# Patient Record
Sex: FEMALE | Race: BLACK OR AFRICAN AMERICAN | NOT HISPANIC OR LATINO | URBAN - METROPOLITAN AREA
[De-identification: names, ages, dates, MRNs, and addresses within clinical notes are randomized per-mention and may not be internally consistent; named-entity substitution may affect disease eponyms.]

---

## 2017-01-10 ENCOUNTER — ANESTHESIA (OUTPATIENT)
Dept: GASTROENTEROLOGY | Facility: HOSPITAL | Age: 27
End: 2017-01-10

## 2017-01-10 ENCOUNTER — ANESTHESIA EVENT (OUTPATIENT)
Dept: GASTROENTEROLOGY | Facility: HOSPITAL | Age: 27
End: 2017-01-10

## 2017-01-10 PROCEDURE — 25010000002 PROPOFOL 10 MG/ML EMULSION: Performed by: ANESTHESIOLOGY

## 2017-01-10 RX ORDER — PROPOFOL 10 MG/ML
VIAL (ML) INTRAVENOUS AS NEEDED
Status: DISCONTINUED | OUTPATIENT
Start: 2017-01-10 | End: 2017-01-10 | Stop reason: SURG

## 2017-01-10 RX ORDER — PROPOFOL 10 MG/ML
VIAL (ML) INTRAVENOUS CONTINUOUS PRN
Status: DISCONTINUED | OUTPATIENT
Start: 2017-01-10 | End: 2017-01-10 | Stop reason: SURG

## 2017-01-10 RX ADMIN — SODIUM CHLORIDE, POTASSIUM CHLORIDE, SODIUM LACTATE AND CALCIUM CHLORIDE: 600; 310; 30; 20 INJECTION, SOLUTION INTRAVENOUS at 08:48

## 2017-01-10 RX ADMIN — PROPOFOL 100 MG: 10 INJECTION, EMULSION INTRAVENOUS at 08:49

## 2017-01-10 RX ADMIN — PROPOFOL 100 MCG/KG/MIN: 10 INJECTION, EMULSION INTRAVENOUS at 08:49

## 2017-01-10 NOTE — ANESTHESIA POSTPROCEDURE EVALUATION
Patient: Keri Pool    Procedure Summary     Date Anesthesia Start Anesthesia Stop Room / Location    01/10/17 0849 0904  [facility]       Procedure Diagnosis Surgeon Provider    ESOPHAGOGASTRODUODENOSCOPY WITH BX (N/A Esophagus) Early satiety; Epigastric pain  (Early satiety [R68.81]; Epigastric pain [R10.13]) MD Christopher Tao MD          Anesthesia Type: MAC  Last vitals  /85 (01/10/17 0932)    Temp      Pulse 69 (01/10/17 0932)   Resp 16 (01/10/17 0932)    SpO2 100 % (01/10/17 0932)      Post Anesthesia Care and Evaluation    Patient location during evaluation: PACU  Patient participation: complete - patient participated  Level of consciousness: awake and alert  Pain score: 0  Pain management: adequate  Airway patency: patent  Anesthetic complications: No anesthetic complications  Cardiovascular status: acceptable  Respiratory status: acceptable  Hydration status: acceptable

## 2017-01-10 NOTE — ANESTHESIA PREPROCEDURE EVALUATION
Anesthesia Evaluation     Patient summary reviewed and Nursing notes reviewed    Airway   Mallampati: II  TM distance: >3 FB  Neck ROM: full  no difficulty expected  Dental - normal exam     Pulmonary - negative pulmonary ROS and normal exam   Cardiovascular - negative cardio ROS and normal exam  Exercise tolerance: good (4-7 METS)    Rhythm: regular  Rate: normal    Neuro/Psych- negative ROS  GI/Hepatic/Renal/Endo      Musculoskeletal (-) negative ROS    Abdominal  - normal exam   Substance History - negative use     OB/GYN          Other - negative ROS                            Anesthesia Plan    ASA 1     MAC     intravenous induction   Anesthetic plan and risks discussed with patient.

## 2017-01-10 NOTE — ANESTHESIA POSTPROCEDURE EVALUATION
Patient: Keri Pool    Procedure Summary     Date Anesthesia Start Anesthesia Stop Room / Location    01/10/17 0849 0904  [facility]       Procedure Diagnosis Surgeon Provider    ESOPHAGOGASTRODUODENOSCOPY WITH BX (N/A Esophagus) Early satiety; Epigastric pain  (Early satiety [R68.81]; Epigastric pain [R10.13]) MD Christopher Tao MD          Anesthesia Type: MAC  Last vitals  /85 (01/10/17 0932)    Temp      Pulse 69 (01/10/17 0932)   Resp 16 (01/10/17 0932)    SpO2 100 % (01/10/17 0932)      Post Anesthesia Care and Evaluation    Patient location during evaluation: PACU  Patient participation: complete - patient participated  Level of consciousness: awake and alert  Pain management: adequate  Airway patency: patent  Anesthetic complications: No anesthetic complications  Cardiovascular status: acceptable  Respiratory status: acceptable  Hydration status: acceptable

## 2017-01-26 ENCOUNTER — TELEPHONE (OUTPATIENT)
Dept: GASTROENTEROLOGY | Facility: CLINIC | Age: 27
End: 2017-01-26

## 2017-01-26 NOTE — TELEPHONE ENCOUNTER
----- Message from Dre Cunningham MD sent at 1/12/2017  3:11 PM EST -----  Essentially normal biopsies from EGD.  Follow-up as needed

## 2017-01-26 NOTE — TELEPHONE ENCOUNTER
Patient called advised as per Dr. Cunningham's note her EGD biopsies were normal and should f/u as needed. She verb understanding.

## 2023-07-19 ENCOUNTER — HOSPITAL ENCOUNTER (EMERGENCY)
Facility: HOSPITAL | Age: 33
Discharge: HOME | End: 2023-07-20
Attending: EMERGENCY MEDICINE | Admitting: EMERGENCY MEDICINE
Payer: COMMERCIAL

## 2023-07-19 DIAGNOSIS — O26.899 ABDOMINAL PAIN AFFECTING PREGNANCY: ICD-10-CM

## 2023-07-19 DIAGNOSIS — R10.9 ABDOMINAL PAIN AFFECTING PREGNANCY: ICD-10-CM

## 2023-07-19 DIAGNOSIS — N89.8 VAGINAL DISCHARGE: ICD-10-CM

## 2023-07-19 DIAGNOSIS — O21.9 VOMITING DURING PREGNANCY: Primary | ICD-10-CM

## 2023-07-19 DIAGNOSIS — N39.0 ACUTE UTI: ICD-10-CM

## 2023-07-19 LAB
BASOPHILS # BLD AUTO: 0.02 10*3/MM3 (ref 0–0.2)
BASOPHILS NFR BLD AUTO: 0.4 % (ref 0–1.5)
BILIRUB UR QL STRIP: NEGATIVE
CLARITY UR: CLEAR
COLOR UR: YELLOW
DEPRECATED RDW RBC AUTO: 47.4 FL (ref 37–54)
EOSINOPHIL # BLD AUTO: 0.15 10*3/MM3 (ref 0–0.4)
EOSINOPHIL NFR BLD AUTO: 2.9 % (ref 0.3–6.2)
ERYTHROCYTE [DISTWIDTH] IN BLOOD BY AUTOMATED COUNT: 13.3 % (ref 12.3–15.4)
GLUCOSE UR STRIP-MCNC: NEGATIVE MG/DL
HCT VFR BLD AUTO: 39.4 % (ref 34–46.6)
HGB BLD-MCNC: 12.8 G/DL (ref 12–15.9)
HGB UR QL STRIP.AUTO: NEGATIVE
IMM GRANULOCYTES # BLD AUTO: 0 10*3/MM3 (ref 0–0.05)
IMM GRANULOCYTES NFR BLD AUTO: 0 % (ref 0–0.5)
KETONES UR QL STRIP: ABNORMAL
LEUKOCYTE ESTERASE UR QL STRIP.AUTO: ABNORMAL
LYMPHOCYTES # BLD AUTO: 2.04 10*3/MM3 (ref 0.7–3.1)
LYMPHOCYTES NFR BLD AUTO: 39.2 % (ref 19.6–45.3)
MCH RBC QN AUTO: 31 PG (ref 26.6–33)
MCHC RBC AUTO-ENTMCNC: 32.5 G/DL (ref 31.5–35.7)
MCV RBC AUTO: 95.4 FL (ref 79–97)
MONOCYTES # BLD AUTO: 0.44 10*3/MM3 (ref 0.1–0.9)
MONOCYTES NFR BLD AUTO: 8.4 % (ref 5–12)
NEUTROPHILS NFR BLD AUTO: 2.56 10*3/MM3 (ref 1.7–7)
NEUTROPHILS NFR BLD AUTO: 49.1 % (ref 42.7–76)
NITRITE UR QL STRIP: NEGATIVE
PH UR STRIP.AUTO: 6 [PH] (ref 5–8)
PLATELET # BLD AUTO: 187 10*3/MM3 (ref 140–450)
PMV BLD AUTO: 10 FL (ref 6–12)
PROT UR QL STRIP: NEGATIVE
RBC # BLD AUTO: 4.13 10*6/MM3 (ref 3.77–5.28)
SP GR UR STRIP: 1.02 (ref 1–1.03)
UROBILINOGEN UR QL STRIP: ABNORMAL
WBC NRBC COR # BLD: 5.21 10*3/MM3 (ref 3.4–10.8)

## 2023-07-19 PROCEDURE — 81001 URINALYSIS AUTO W/SCOPE: CPT | Performed by: EMERGENCY MEDICINE

## 2023-07-19 PROCEDURE — 83690 ASSAY OF LIPASE: CPT | Performed by: EMERGENCY MEDICINE

## 2023-07-19 PROCEDURE — 96374 THER/PROPH/DIAG INJ IV PUSH: CPT

## 2023-07-19 PROCEDURE — 87491 CHLMYD TRACH DNA AMP PROBE: CPT | Performed by: EMERGENCY MEDICINE

## 2023-07-19 PROCEDURE — 96361 HYDRATE IV INFUSION ADD-ON: CPT

## 2023-07-19 PROCEDURE — 87086 URINE CULTURE/COLONY COUNT: CPT | Performed by: EMERGENCY MEDICINE

## 2023-07-19 PROCEDURE — 25010000002 METOCLOPRAMIDE PER 10 MG: Performed by: EMERGENCY MEDICINE

## 2023-07-19 PROCEDURE — 87591 N.GONORRHOEAE DNA AMP PROB: CPT | Performed by: EMERGENCY MEDICINE

## 2023-07-19 PROCEDURE — 99284 EMERGENCY DEPT VISIT MOD MDM: CPT

## 2023-07-19 PROCEDURE — 85025 COMPLETE CBC W/AUTO DIFF WBC: CPT | Performed by: EMERGENCY MEDICINE

## 2023-07-19 PROCEDURE — 80053 COMPREHEN METABOLIC PANEL: CPT | Performed by: EMERGENCY MEDICINE

## 2023-07-19 RX ORDER — METOCLOPRAMIDE HYDROCHLORIDE 5 MG/ML
10 INJECTION INTRAMUSCULAR; INTRAVENOUS ONCE
Status: COMPLETED | OUTPATIENT
Start: 2023-07-19 | End: 2023-07-19

## 2023-07-19 RX ORDER — SODIUM CHLORIDE 0.9 % (FLUSH) 0.9 %
10 SYRINGE (ML) INJECTION AS NEEDED
Status: DISCONTINUED | OUTPATIENT
Start: 2023-07-19 | End: 2023-07-20 | Stop reason: HOSPADM

## 2023-07-19 RX ADMIN — SODIUM CHLORIDE 1000 ML: 9 INJECTION, SOLUTION INTRAVENOUS at 23:48

## 2023-07-19 RX ADMIN — METOCLOPRAMIDE HYDROCHLORIDE 10 MG: 5 INJECTION INTRAMUSCULAR; INTRAVENOUS at 23:56

## 2023-07-19 NOTE — Clinical Note
[facility]  [address]  [place] [address]    Keri Pool was seen and treated in our emergency department on 7/19/2023.  She may return to work on 07/20/2023.         Thank you for choosing [facility].    Alpa Perez RN

## 2023-07-20 VITALS
SYSTOLIC BLOOD PRESSURE: 122 MMHG | HEART RATE: 72 BPM | DIASTOLIC BLOOD PRESSURE: 87 MMHG | HEIGHT: 68 IN | TEMPERATURE: 98 F | BODY MASS INDEX: 25.01 KG/M2 | WEIGHT: 165 LBS | OXYGEN SATURATION: 98 % | RESPIRATION RATE: 16 BRPM

## 2023-07-20 LAB
ALBUMIN SERPL-MCNC: 4.5 G/DL (ref 3.5–5.2)
ALBUMIN/GLOB SERPL: 1.7 G/DL
ALP SERPL-CCNC: 65 U/L (ref 39–117)
ALT SERPL W P-5'-P-CCNC: 7 U/L (ref 1–33)
ANION GAP SERPL CALCULATED.3IONS-SCNC: 9.6 MMOL/L (ref 5–15)
AST SERPL-CCNC: 10 U/L (ref 1–32)
BACTERIA UR QL AUTO: ABNORMAL /HPF
BILIRUB SERPL-MCNC: 0.4 MG/DL (ref 0–1.2)
BUN SERPL-MCNC: 8 MG/DL (ref 6–20)
BUN/CREAT SERPL: 12.3 (ref 7–25)
CALCIUM SPEC-SCNC: 9.6 MG/DL (ref 8.6–10.5)
CHLORIDE SERPL-SCNC: 104 MMOL/L (ref 98–107)
CO2 SERPL-SCNC: 21.4 MMOL/L (ref 22–29)
CREAT SERPL-MCNC: 0.65 MG/DL (ref 0.57–1)
EGFRCR SERPLBLD CKD-EPI 2021: 120.9 ML/MIN/1.73
GLOBULIN UR ELPH-MCNC: 2.7 GM/DL
GLUCOSE SERPL-MCNC: 99 MG/DL (ref 65–99)
HYALINE CASTS UR QL AUTO: ABNORMAL /LPF
LIPASE SERPL-CCNC: 22 U/L (ref 13–60)
POTASSIUM SERPL-SCNC: 3.6 MMOL/L (ref 3.5–5.2)
PROT SERPL-MCNC: 7.2 G/DL (ref 6–8.5)
RBC # UR STRIP: ABNORMAL /HPF
REF LAB TEST METHOD: ABNORMAL
SODIUM SERPL-SCNC: 135 MMOL/L (ref 136–145)
SQUAMOUS #/AREA URNS HPF: ABNORMAL /HPF
WBC # UR STRIP: ABNORMAL /HPF

## 2023-07-20 PROCEDURE — 87798 DETECT AGENT NOS DNA AMP: CPT | Performed by: EMERGENCY MEDICINE

## 2023-07-20 PROCEDURE — 87801 DETECT AGNT MULT DNA AMPLI: CPT | Performed by: EMERGENCY MEDICINE

## 2023-07-20 PROCEDURE — 87661 TRICHOMONAS VAGINALIS AMPLIF: CPT | Performed by: EMERGENCY MEDICINE

## 2023-07-20 RX ORDER — CEPHALEXIN 500 MG/1
500 CAPSULE ORAL 2 TIMES DAILY
Qty: 14 CAPSULE | Refills: 0 | Status: SHIPPED | OUTPATIENT
Start: 2023-07-20 | End: 2023-07-27

## 2023-07-20 RX ORDER — PROMETHAZINE HYDROCHLORIDE 12.5 MG/1
12.5 TABLET ORAL EVERY 6 HOURS PRN
Qty: 20 TABLET | Refills: 0 | Status: SHIPPED | OUTPATIENT
Start: 2023-07-20

## 2023-07-20 RX ORDER — CEPHALEXIN 500 MG/1
500 CAPSULE ORAL ONCE
Status: COMPLETED | OUTPATIENT
Start: 2023-07-20 | End: 2023-07-20

## 2023-07-20 RX ADMIN — CEPHALEXIN 500 MG: 500 CAPSULE ORAL at 01:21

## 2023-07-20 NOTE — FSED PROVIDER NOTE
Subjective   History of Present Illness  32-year-old female  presents complaining of nausea and vomiting.  Patient states she is about 6 weeks pregnant and had an ultrasound performed yesterday showing an IUP, ANDREA 3/14/24.  She has been having some mild pelvic cramping recently that she assumed was related to the pregnancy.  She has had nausea and nonbilious nonbloody vomiting x3 today so she was sent home from work for further evaluation.  Patient denies fevers.  She denies dysuria or hematuria.  She denies vaginal bleeding but reports she has had some yeasty discharge.    History provided by:  Patient   used: Yes      Review of Systems   Constitutional: Negative.  Negative for fever.   Respiratory: Negative.  Negative for shortness of breath.    Cardiovascular: Negative.  Negative for chest pain.   Gastrointestinal:  Positive for nausea and vomiting. Negative for abdominal pain.   Genitourinary:  Positive for pelvic pain (mild cramping) and vaginal discharge. Negative for dysuria and vaginal bleeding.   All other systems reviewed and are negative.    Past Medical History:   Diagnosis Date    ASCUS with positive high risk HPV cervical     Chlamydia     Gallbladder disease     Herpes simplex type 2 infection     Vaginal delivery        Allergies   Allergen Reactions    Sulfa Antibiotics Rash       Past Surgical History:   Procedure Laterality Date    CHOLECYSTECTOMY      ENDOSCOPY N/A 1/10/2017    Procedure: ESOPHAGOGASTRODUODENOSCOPY WITH BX;  Surgeon: Dre Cunningham MD;  Location: [facility] END[facility]COPY;  Service:        Family History   Problem Relation Age of Onset    Heart disease Other         FH of heart disease in males before age 55       Social History     Socioeconomic History    Marital status: Single   Tobacco Use    Smoking status: Never    Smokeless tobacco: Never   Vaping Use    Vaping Use: Never used   Substance and Sexual Activity    Alcohol use: No    Drug use: Yes      Frequency: 3.0 times per week     Types: Marijuana    Sexual activity: Defer           Objective   Physical Exam  Vitals and nursing note reviewed.   Constitutional:       General: She is not in acute distress.     Appearance: She is not diaphoretic.   HENT:      Head: Normocephalic and atraumatic.      Right Ear: External ear normal.      Left Ear: External ear normal.      Nose: Nose normal.   Eyes:      Pupils: Pupils are equal, round, and reactive to light.   Cardiovascular:      Rate and Rhythm: Normal rate and regular rhythm.      Heart sounds: Normal heart sounds.   Pulmonary:      Effort: Pulmonary effort is normal. No respiratory distress.      Breath sounds: Normal breath sounds.   Abdominal:      Palpations: Abdomen is soft.      Tenderness: There is no abdominal tenderness.   Genitourinary:     General: Normal vulva.      Vagina: Vaginal discharge present.      Cervix: Normal.      Uterus: Normal.       Adnexa: Right adnexa normal and left adnexa normal.      Comments: RN chaperone  White clumpy discharge  Musculoskeletal:         General: No swelling or tenderness. Normal range of motion.      Cervical back: Normal range of motion and neck supple.   Skin:     General: Skin is warm and dry.      Findings: No rash.   Neurological:      General: No focal deficit present.      Mental Status: She is alert and oriented to person, place, and time.   Psychiatric:         Mood and Affect: Mood normal.         Behavior: Behavior normal.       Procedures           ED Course                                           Medical Decision Making  Number and Complexity of Problems  Differential Diagnosis: Electrolyte abnormality, UTI, hyperemesis, threatened AB    MDM Data  My Laboratory interpretation: Unremarkable except (+) LE in the urine, pending vaginal swabs  My Radiology interpretation: n/a not indicated    Treatment and Disposition  ED Course: 32-year-old female presenting with symptoms as described.  She already  has a confirmed IUP and her bedside ultrasound today was unremarkable.  She has had improvement with IV fluids and symptom control.  She is tolerating oral intake.  Her labs are unremarkable.  She is having no bleeding so Rh testing and/or formal ultrasound are not indicated.  We will treat empirically for UTI and yeast infection pending cultures and swab information.  Patient declined any other needs including for STD prophylaxis.    Shared decision making: Discharge home, symptom control, treatment for yeast    Problems Addressed:  Abdominal pain affecting pregnancy: complicated acute illness or injury  Acute UTI: complicated acute illness or injury  Vaginal discharge: complicated acute illness or injury  Vomiting during pregnancy: complicated acute illness or injury    Amount and/or Complexity of Data Reviewed  Labs: ordered.    Risk  OTC drugs.  Prescription drug management.        Final diagnoses:   Vomiting during pregnancy   Abdominal pain affecting pregnancy   Acute UTI   Vaginal discharge       ED Disposition  ED Disposition       ED Disposition   Discharge    Condition   Stable    Comment   --               Your OB/GYN    Schedule an appointment as soon as possible for a visit            Medication List        New Prescriptions      cephalexin 500 MG capsule  Commonly known as: KEFLEX  Take 1 capsule by mouth 2 (Two) Times a Day for 7 days.     miconazole 2 % vaginal cream  Commonly known as: MICOTIN  Insert 1 applicator into the vagina Every Night for 7 days.     promethazine 12.5 MG tablet  Commonly known as: PHENERGAN  Take 1 tablet by mouth Every 6 (Six) Hours As Needed for Nausea or Vomiting.               Where to Get Your Medications        These medications were sent to [id number] - [place], [place] - [address] AT [facility] - [phone]  - [phone] [address] [address]      Phone: [phone]   cephalexin 500 MG  capsule  miconazole 2 % vaginal cream  promethazine 12.5 MG tablet

## 2023-07-20 NOTE — DISCHARGE INSTRUCTIONS
Your vaginal swabs are pending.  We are treating for presumed yeast infection.  You will receive a call if you need any other medications.

## 2023-07-21 LAB
BACTERIA SPEC AEROBE CULT: NO GROWTH
C TRACH RRNA SPEC QL NAA+PROBE: NEGATIVE
N GONORRHOEA RRNA SPEC QL NAA+PROBE: NEGATIVE
SPECIMEN STATUS: NORMAL

## 2023-07-22 LAB
A VAGINAE DNA VAG QL NAA+PROBE: ABNORMAL SCORE
BVAB2 DNA VAG QL NAA+PROBE: ABNORMAL SCORE
C ALBICANS DNA VAG QL NAA+PROBE: NEGATIVE
C GLABRATA DNA VAG QL NAA+PROBE: NEGATIVE
MEGA1 DNA VAG QL NAA+PROBE: ABNORMAL SCORE

## 2023-07-22 RX ORDER — CLINDAMYCIN HYDROCHLORIDE 300 MG/1
300 CAPSULE ORAL 2 TIMES DAILY
Qty: 14 CAPSULE | Refills: 0 | Status: SHIPPED | OUTPATIENT
Start: 2023-07-22 | End: 2023-07-26

## 2023-07-22 NOTE — PROGRESS NOTES
Pt is 1st trimester.  I spoke with Ms Pool regarding BV diagnosis.   I sent in clindamycin 300 bid x 7 days and recommended retesting after treatment.

## 2023-07-25 LAB — T VAGINALIS RRNA SPEC QL NAA+PROBE: POSITIVE

## 2023-07-26 ENCOUNTER — TELEPHONE (OUTPATIENT)
Dept: EMERGENCY DEPT | Facility: HOSPITAL | Age: 33
End: 2023-07-26
Payer: COMMERCIAL

## 2023-07-26 RX ORDER — METRONIDAZOLE 500 MG/1
500 TABLET ORAL 2 TIMES DAILY
Qty: 14 TABLET | Refills: 0 | Status: SHIPPED | OUTPATIENT
Start: 2023-07-26 | End: 2023-08-02

## 2023-08-09 ENCOUNTER — OFFICE VISIT (OUTPATIENT)
Dept: OBSTETRICS AND GYNECOLOGY | Age: 33
End: 2023-08-09
Payer: COMMERCIAL

## 2023-08-09 ENCOUNTER — TRANSCRIBE ORDERS (OUTPATIENT)
Dept: OBSTETRICS AND GYNECOLOGY | Facility: CLINIC | Age: 33
End: 2023-08-09
Payer: COMMERCIAL

## 2023-08-09 VITALS
BODY MASS INDEX: 22.43 KG/M2 | SYSTOLIC BLOOD PRESSURE: 122 MMHG | DIASTOLIC BLOOD PRESSURE: 82 MMHG | HEIGHT: 68 IN | WEIGHT: 148 LBS

## 2023-08-09 DIAGNOSIS — A59.01 TRICHOMONAL VAGINITIS: ICD-10-CM

## 2023-08-09 DIAGNOSIS — Z12.4 ENCOUNTER FOR PAPANICOLAOU SMEAR FOR CERVICAL CANCER SCREENING: ICD-10-CM

## 2023-08-09 DIAGNOSIS — O28.3 ABNORMAL FETAL ULTRASOUND: ICD-10-CM

## 2023-08-09 DIAGNOSIS — O21.9 NAUSEA AND VOMITING IN PREGNANCY: ICD-10-CM

## 2023-08-09 DIAGNOSIS — Z34.90 EARLY STAGE OF PREGNANCY: Primary | ICD-10-CM

## 2023-08-09 DIAGNOSIS — Z11.51 SCREENING FOR HPV (HUMAN PAPILLOMAVIRUS): ICD-10-CM

## 2023-08-09 DIAGNOSIS — Z87.42 H/O ABNORMAL CERVICAL PAPANICOLAOU SMEAR: ICD-10-CM

## 2023-08-09 DIAGNOSIS — N76.0 BV (BACTERIAL VAGINOSIS): ICD-10-CM

## 2023-08-09 DIAGNOSIS — B96.89 BV (BACTERIAL VAGINOSIS): ICD-10-CM

## 2023-08-09 RX ORDER — DOXYLAMINE SUCCINATE AND PYRIDOXINE HYDROCHLORIDE 20; 20 MG/1; MG/1
1 TABLET, EXTENDED RELEASE ORAL 2 TIMES DAILY
Qty: 60 TABLET | Refills: 0 | Status: SHIPPED | OUTPATIENT
Start: 2023-08-09 | End: 2023-08-10

## 2023-08-09 RX ORDER — CLINDAMYCIN HYDROCHLORIDE 300 MG/1
300 CAPSULE ORAL 3 TIMES DAILY
COMMUNITY
End: 2023-08-09

## 2023-08-09 RX ORDER — CEPHALEXIN 500 MG/1
500 CAPSULE ORAL 2 TIMES DAILY
COMMUNITY
End: 2023-08-09

## 2023-08-09 RX ORDER — METRONIDAZOLE 500 MG/1
500 TABLET ORAL 2 TIMES DAILY
COMMUNITY
End: 2023-08-09

## 2023-08-09 RX ORDER — CLOTRIMAZOLE 1 %
CREAM WITH APPLICATOR VAGINAL
COMMUNITY
Start: 2023-07-20 | End: 2023-08-09

## 2023-08-09 RX ORDER — ONDANSETRON 4 MG/1
4 TABLET, FILM COATED ORAL DAILY PRN
Qty: 30 TABLET | Refills: 1 | Status: SHIPPED | OUTPATIENT
Start: 2023-08-09 | End: 2024-08-08

## 2023-08-09 NOTE — Clinical Note
Hi. She has a pretty complicated situation. Please review her chart and let me know if there is anything else you would suggest

## 2023-08-09 NOTE — PROGRESS NOTES
"Subjective     Chief Complaint   Patient presents with    Possible Pregnancy     New/re-establish pt of Dr French pregnancy confirmation with ultrasound. C/o very sick has not been able to take any medication.       Keri Pool is a 32 y.o.  whose LMP is Patient's last menstrual period was 2023. presents to establish care for new pregnancy  She is re establishing care  Last seen by me in 2016    Has h/o 2 's, one in  and one in     This is unplanned and the FOB is not involved   Of note, FOB just fathered a child that had an 8% chance of survival-baby was delivered early and  a month later  She is unsure what the dx was  Was considering an ab pill or  but unable to travel    She has nausea and vomiting  Having a hard time eating  Was seen in the ER recently and given meds for nausea, also dxed with a uti, bv and trich  She has been unable to take the medications d/t her illness  Has h/o this with previous pregnancies and eventually needed a PICC line with one of her pregnancies    She does have a h/o abnormal pap  Declines exam today d/t illness    Has been at the [facility] dealing with her anxiety  Was prescribed meclizine but I have advised her to d/c this  We can try zoloft    She works at [employer]  Is on medical leave b/c of her mental health but really needs to keep this job    No Additional Complaints Reported    The following portions of the patient's history were reviewed and updated as appropriate:no additional history reviewed, vital signs, allergies, current medications, past medical history, past social history, past surgical history, and problem list      Review of Systems   Genitourinary:positive for early stage of pregnancy     Objective      /82   Ht 172.7 cm (68\")   Wt 67.1 kg (148 lb)   LMP 2023   BMI 22.50 kg/mý     Physical Exam    General:   alert, comfortable, and no distress   Heart: Not performed today   Lungs: Not performed today.   Breast: Not " performed today   Neck: Not performed today   Abdomen: Not performed today   CVA: Not performed today   Pelvis: Not performed today   Extremities: Not performed today   Neurologic: negative   Psychiatric: Normal affect, judgement, and mood       Lab Review   Labs: reviewed labs from hospital  Trichomonas vaginalis, PCR - Swab, Vagina (07/20/2023 00:58)   Imaging   Ultrasound - Pelvic Vaginal  Fetal heart rate: 171  Basis for EDC: LMP consistent with US (first trimester)  ANDREA 3/7/2024  Possible edema fetal head and abdomen  Assessment & Plan     ASSESSMENT  1. Early stage of pregnancy    2. Encounter for Papanicolaou smear for cervical cancer screening    3. Screening for HPV (human papillomavirus)    4. H/O abnormal cervical Papanicolaou smear    5. Trichomonal vaginitis    6. Nausea and vomiting in pregnancy    7. BV (bacterial vaginosis)          PLAN  1.   Orders Placed This Encounter   Procedures    Comprehensive Metabolic Panel    Hemoglobin A1c    TSH    [place] Panorama Prenatal Test Full Panel: Panorama Test Plus 5 Additional Microdeletions - Blood,    [place] Horizon 14 (Pan-Ethnic Standard) - Blood,    Ambulatory Referral to [facility]/Perinatology    CBC & Differential       2. Medications prescribed this encounter:        New Medications Ordered This Visit   Medications    ondansetron (Zofran) 4 MG tablet     Sig: Take 1 tablet by mouth Daily As Needed for Nausea or Vomiting.     Dispense:  30 tablet     Refill:  1    Doxylamine-Pyridoxine ER (Bonjesta) 20-20 MG tablet controlled-release     Sig: Take 1 tablet by mouth 2 (Two) Times a Day.     Dispense:  60 tablet     Refill:  0       3. Start meds for nausea. Declines rectal suppositories. Will try zofran odt but also prescribed bonjesta if she is able to swallow a pill.     4. Disc u/s findings. Would recommend NIPT. She is >9 wks so testing done today.  Also refer to [facility].      5. She may look into alternate options in [place].     Currently unable to tolerate  po meds. Will hopefully get improvement with her nausea and vomiting and can then treat her bv and trich.      Labs ordered today to assess electrolytes.  May need HH to treat her sx's.     Follow up: 2 week(s)    RUDY Lemons  8/9/2023

## 2023-08-10 ENCOUNTER — OFFICE VISIT (OUTPATIENT)
Dept: OBSTETRICS AND GYNECOLOGY | Facility: CLINIC | Age: 33
End: 2023-08-10
Payer: COMMERCIAL

## 2023-08-10 ENCOUNTER — TELEPHONE (OUTPATIENT)
Dept: OBSTETRICS AND GYNECOLOGY | Age: 33
End: 2023-08-10
Payer: COMMERCIAL

## 2023-08-10 ENCOUNTER — HOSPITAL ENCOUNTER (OUTPATIENT)
Dept: ULTRASOUND IMAGING | Facility: HOSPITAL | Age: 33
Discharge: HOME | End: 2023-08-10
Admitting: OBSTETRICS & GYNECOLOGY
Payer: COMMERCIAL

## 2023-08-10 VITALS
HEIGHT: 68 IN | DIASTOLIC BLOOD PRESSURE: 80 MMHG | SYSTOLIC BLOOD PRESSURE: 132 MMHG | TEMPERATURE: 99.8 F | BODY MASS INDEX: 22.58 KG/M2 | WEIGHT: 149 LBS | HEART RATE: 103 BPM

## 2023-08-10 DIAGNOSIS — Z03.73 SUSPECTED FETAL ANOMALY NOT FOUND: Primary | ICD-10-CM

## 2023-08-10 DIAGNOSIS — Z34.90 EARLY STAGE OF PREGNANCY: ICD-10-CM

## 2023-08-10 DIAGNOSIS — O28.3 ABNORMAL FETAL ULTRASOUND: ICD-10-CM

## 2023-08-10 LAB
ALBUMIN SERPL-MCNC: 4.8 G/DL (ref 3.5–5.2)
ALBUMIN/GLOB SERPL: 1.9 G/DL
ALP SERPL-CCNC: 62 U/L (ref 39–117)
ALT SERPL-CCNC: 24 U/L (ref 1–33)
AST SERPL-CCNC: 20 U/L (ref 1–32)
BASOPHILS # BLD AUTO: 0.03 10*3/MM3 (ref 0–0.2)
BASOPHILS NFR BLD AUTO: 0.6 % (ref 0–1.5)
BILIRUB SERPL-MCNC: 0.3 MG/DL (ref 0–1.2)
BUN SERPL-MCNC: 6 MG/DL (ref 6–20)
BUN/CREAT SERPL: 8.5 (ref 7–25)
CALCIUM SERPL-MCNC: 10.1 MG/DL (ref 8.6–10.5)
CHLORIDE SERPL-SCNC: 100 MMOL/L (ref 98–107)
CO2 SERPL-SCNC: 24.1 MMOL/L (ref 22–29)
CREAT SERPL-MCNC: 0.71 MG/DL (ref 0.57–1)
EGFRCR SERPLBLD CKD-EPI 2021: 116.7 ML/MIN/1.73
EOSINOPHIL # BLD AUTO: 0.1 10*3/MM3 (ref 0–0.4)
EOSINOPHIL NFR BLD AUTO: 1.9 % (ref 0.3–6.2)
ERYTHROCYTE [DISTWIDTH] IN BLOOD BY AUTOMATED COUNT: 12.1 % (ref 12.3–15.4)
GLOBULIN SER CALC-MCNC: 2.5 GM/DL
GLUCOSE SERPL-MCNC: 82 MG/DL (ref 65–99)
HBA1C MFR BLD: 5.4 % (ref 4.8–5.6)
HCT VFR BLD AUTO: 41.2 % (ref 34–46.6)
HGB BLD-MCNC: 14.3 G/DL (ref 12–15.9)
IMM GRANULOCYTES # BLD AUTO: 0.01 10*3/MM3 (ref 0–0.05)
IMM GRANULOCYTES NFR BLD AUTO: 0.2 % (ref 0–0.5)
LYMPHOCYTES # BLD AUTO: 1.3 10*3/MM3 (ref 0.7–3.1)
LYMPHOCYTES NFR BLD AUTO: 24.4 % (ref 19.6–45.3)
MCH RBC QN AUTO: 32 PG (ref 26.6–33)
MCHC RBC AUTO-ENTMCNC: 34.7 G/DL (ref 31.5–35.7)
MCV RBC AUTO: 92.2 FL (ref 79–97)
MONOCYTES # BLD AUTO: 0.42 10*3/MM3 (ref 0.1–0.9)
MONOCYTES NFR BLD AUTO: 7.9 % (ref 5–12)
NEUTROPHILS # BLD AUTO: 3.46 10*3/MM3 (ref 1.7–7)
NEUTROPHILS NFR BLD AUTO: 65 % (ref 42.7–76)
NRBC BLD AUTO-RTO: 0 /100 WBC (ref 0–0.2)
PLATELET # BLD AUTO: 207 10*3/MM3 (ref 140–450)
POTASSIUM SERPL-SCNC: 4.1 MMOL/L (ref 3.5–5.2)
PROT SERPL-MCNC: 7.3 G/DL (ref 6–8.5)
RBC # BLD AUTO: 4.47 10*6/MM3 (ref 3.77–5.28)
SODIUM SERPL-SCNC: 135 MMOL/L (ref 136–145)
TSH SERPL DL<=0.005 MIU/L-ACNC: 1.28 UIU/ML (ref 0.27–4.2)
WBC # BLD AUTO: 5.32 10*3/MM3 (ref 3.4–10.8)

## 2023-08-10 PROCEDURE — 76815 OB US LIMITED FETUS(S): CPT

## 2023-08-10 PROCEDURE — 76817 TRANSVAGINAL US OBSTETRIC: CPT

## 2023-08-10 RX ORDER — DOXYLAMINE SUCCINATE AND PYRIDOXINE HYDROCHLORIDE, DELAYED RELEASE TABLETS 10 MG/10 MG 10; 10 MG/1; MG/1
TABLET, DELAYED RELEASE ORAL
Qty: 100 TABLET | Refills: 2 | Status: SHIPPED | OUTPATIENT
Start: 2023-08-10 | End: 2023-08-14 | Stop reason: SDUPTHER

## 2023-08-10 NOTE — PROGRESS NOTES
MATERNAL FETAL MEDICINE Consult Note    Dear Dr Hanane iFtzgerald PA:    Thank you for your kind referral of Keri Pool.  As you know, she is a 32 y.o.   at  10 0/7 weeks gestation (Estimated Date of Delivery: 3/7/24). This is a consult.      Her antepartum course is complicated by:  Cystic hygroma not found    Aneuploidy Screening: pending    HPI: Today, she denies headache, blurry vision, RUQ pain. No vaginal bleeding, no contractions.     Review of History:  Past Medical History:   Diagnosis Date    ASCUS with positive high risk HPV cervical     Chlamydia     Gallbladder disease     Herpes simplex type 2 infection     Vaginal delivery      Past Surgical History:   Procedure Laterality Date    CHOLECYSTECTOMY      ENDOSCOPY N/A 1/10/2017    Procedure: ESOPHAGOGASTRODUODENOSCOPY WITH BX;  Surgeon: Dre Cunningham MD;  Location: [facility] ENDOSCOPY;  Service:          Social History     Socioeconomic History    Marital status: Single   Tobacco Use    Smoking status: Never     Passive exposure: Never    Smokeless tobacco: Never   Vaping Use    Vaping Use: Never used   Substance and Sexual Activity    Alcohol use: No    Drug use: Yes     Frequency: 3.0 times per week     Types: Marijuana    Sexual activity: Yes     Family History   Problem Relation Age of Onset    Heart disease Other         FH of heart disease in males before age 55      Allergies   Allergen Reactions    Sulfa Antibiotics Rash      Current Outpatient Medications on File Prior to Visit   Medication Sig Dispense Refill    ondansetron (Zofran) 4 MG tablet Take 1 tablet by mouth Daily As Needed for Nausea or Vomiting. 30 tablet 1    promethazine (PHENERGAN) 12.5 MG tablet Take 1 tablet by mouth Every 6 (Six) Hours As Needed for Nausea or Vomiting. 20 tablet 0    valACYclovir (VALTREX) 500 MG tablet Take 1 tablet by mouth 2 (Two) Times a Day. 60 tablet 6    [DISCONTINUED] Doxylamine-Pyridoxine ER (Bonjesta) 20-20 MG tablet  "controlled-release Take 1 tablet by mouth 2 (Two) Times a Day. (Patient not taking: Reported on 8/10/2023) 60 tablet 0     No current facility-administered medications on file prior to visit.        Past obstetric, gynecological, medical, surgical, family and social history reviewed.  Relevant lab work and imaging reviewed.    Review of systems  Constitutional:  denies fever, chills, malaise.   ENT/Mouth:  denies sore throat, tinnitis  Eyes: denies vision changes/pain  CV:  denies chest pain  Respiratory:  denies cough/SOB  GI:  denies N/V, diarrhea, abdominal pain.    :   denies dysuria  Skin:  denies lesions or pruritis   Neuro:  denies weakness, focal neurologic symptoms    Vitals:    08/10/23 1112   BP: 132/80   BP Location: Right arm   Patient Position: Sitting   Pulse: 103   Temp: 99.8 øF (37.7 øC)   TempSrc: Temporal   Weight: 67.6 kg (149 lb)   Height: 172.7 cm (68\")       PHYSICAL EXAM   GENERAL: Not in acute distress, AAOx3, pleasant  CARDIO: regular rate and rhythm  PULM: symmetric chest rise, speaking in complete sentences without difficulty  NEURO: awake, alert and oriented to person, place, and time  ABDOMINAL: No fundal tenderness, no rebound or guarding, gravid  EXTREMITIES: no bilateral lower extremity edema/tenderness  SKIN: Warm, well-perfused      ULTRASOUND   Please view full ultrasound note on Imaging tab in [employer].  Live viable early intrauterine pregnancy.  Size consistent with dates.  Normal appearing nuchal translucency without evidence of cystic hygroma.   FHT: 176 bpm.     ASSESSMENT/COUNSELIN y.o.   at  10 0/7 weeks gestation (Estimated Date of Delivery: 3/7/24).     -Pregnancy  [ X ] stable  [   ] improving [  ] worsening    Diagnoses and all orders for this visit:    1. Suspected fetal anomaly not found (Primary)         Cystic hygroma, not found:  Discussed that see the fluid filled area on the back of the baby's neck, but it looks very normal.  No evidence of " cystic hygroma or other anomalies, but significantly limited by gestational age.  I am optimistic, but offered patient anatomy here at 18 weeks and she will follow up her cell free DNA screening.  We will do detailed anatomy, but was able to reassure her that I think everything we are seeing right now looks normal.      Summary of Plan  -Anatomy at [facility] 18 weeks    Follow-up: 18 week anatomy at [facility]    Thank you for the consult and opportunity to care for this patient.  Please feel free to reach out with any questions or concerns.      I spent 30 minutes caring for this patient on this date of service. This time includes time spent by me in the following activities: preparing for the visit, reviewing tests, obtaining and/or reviewing a separately obtained history, performing a medically appropriate examination and/or evaluation, counseling and educating the patient/family/caregiver and independently interpreting results and communicating that information with the patient/family/caregiver with greater than 50% spent in counseling and coordination of care.       I spent 5 minutes on the separately reported service of US imaging not included in the time used to support the E/M service also reported today.      Cyn Delatorre MD FACOG  Maternal Fetal Medicine-[facility]  Office: [phone]  [email or web address]

## 2023-08-10 NOTE — TELEPHONE ENCOUNTER
DELETE AFTER REVIEWING: Telephone encounter to be sent to the clinical pool   Hub staff attempted to follow warm transfer process and was unsuccessful     Caller: Keri Pool S    Relationship to patient: Self    Best call back number: [phone]    Patient is needing: PATIENT CALLED AND STATED THAT HER INSURANCE WOULD NOT COVER THE MEDICATION THAT WAS CALLED IN YESTERDAY. SHE WOULD LIKE TO KNOW IF SHE CAN SWITCH TO SOMETHING HER INSURANCE WILL COVER. SHE ALSO WOULD LIKE TO KNOW WHAT 2 MEDICATIONS SHE IS ALLOWED TO TAKE FOR BV.        OK TO CALL ANYTIME  OK TO [facility]

## 2023-08-10 NOTE — LETTER
August 10, 2023     Guillaume French MD  [address] [facility]  [address]  [place] [id number]    Patient: Keri Pool   YOB: 1990   Date of Visit: 8/10/2023       Dear Guillaume French MD,    Thank you for referring Keri Pool to me for evaluation. Below is a copy of my consult note.    If you have questions, please do not hesitate to call me. I look forward to following Keri along with you.         Sincerely,        Cyn Delatorre MD      MATERNAL FETAL MEDICINE Consult Note    Dear Dr Hanane Fitzgerald, PA:    Thank you for your kind referral of Keri Pool.  As you know, she is a 32 y.o.   at  10 0/7 weeks gestation (Estimated Date of Delivery: 3/7/24). This is a consult.      Her antepartum course is complicated by:  Cystic hygroma not found    Aneuploidy Screening: pending    HPI: Today, she denies headache, blurry vision, RUQ pain. No vaginal bleeding, no contractions.     Review of History:  Past Medical History:   Diagnosis Date    ASCUS with positive high risk HPV cervical     Chlamydia     Gallbladder disease     Herpes simplex type 2 infection     Vaginal delivery      Past Surgical History:   Procedure Laterality Date    CHOLECYSTECTOMY      ENDOSCOPY N/A 1/10/2017    Procedure: ESOPHAGOGASTRODUODENOSCOPY WITH BX;  Surgeon: Dre Cunningham MD;  Location: [facility] ENDOSCOPY;  Service:          Social History     Socioeconomic History    Marital status: Single   Tobacco Use    Smoking status: Never     Passive exposure: Never    Smokeless tobacco: Never   Vaping Use    Vaping Use: Never used   Substance and Sexual Activity    Alcohol use: No    Drug use: Yes     Frequency: 3.0 times per week     Types: Marijuana    Sexual activity: Yes     Family History   Problem Relation Age of Onset    Heart disease Other         FH of heart disease in males before age 55      Allergies   Allergen Reactions    Sulfa Antibiotics Rash      Current Outpatient  "Medications on File Prior to Visit   Medication Sig Dispense Refill    ondansetron (Zofran) 4 MG tablet Take 1 tablet by mouth Daily As Needed for Nausea or Vomiting. 30 tablet 1    promethazine (PHENERGAN) 12.5 MG tablet Take 1 tablet by mouth Every 6 (Six) Hours As Needed for Nausea or Vomiting. 20 tablet 0    valACYclovir (VALTREX) 500 MG tablet Take 1 tablet by mouth 2 (Two) Times a Day. 60 tablet 6    [DISCONTINUED] Doxylamine-Pyridoxine ER (Bonjesta) 20-20 MG tablet controlled-release Take 1 tablet by mouth 2 (Two) Times a Day. (Patient not taking: Reported on 8/10/2023) 60 tablet 0     No current facility-administered medications on file prior to visit.        Past obstetric, gynecological, medical, surgical, family and social history reviewed.  Relevant lab work and imaging reviewed.    Review of systems  Constitutional:  denies fever, chills, malaise.   ENT/Mouth:  denies sore throat, tinnitis  Eyes: denies vision changes/pain  CV:  denies chest pain  Respiratory:  denies cough/SOB  GI:  denies N/V, diarrhea, abdominal pain.    :   denies dysuria  Skin:  denies lesions or pruritis   Neuro:  denies weakness, focal neurologic symptoms    Vitals:    08/10/23 1112   BP: 132/80   BP Location: Right arm   Patient Position: Sitting   Pulse: 103   Temp: 99.8 øF (37.7 øC)   TempSrc: Temporal   Weight: 67.6 kg (149 lb)   Height: 172.7 cm (68\")       PHYSICAL EXAM   GENERAL: Not in acute distress, AAOx3, pleasant  CARDIO: regular rate and rhythm  PULM: symmetric chest rise, speaking in complete sentences without difficulty  NEURO: awake, alert and oriented to person, place, and time  ABDOMINAL: No fundal tenderness, no rebound or guarding, gravid  EXTREMITIES: no bilateral lower extremity edema/tenderness  SKIN: Warm, well-perfused      ULTRASOUND   Please view full ultrasound note on Imaging tab in ViewPoint.  Live viable early intrauterine pregnancy.  Size consistent with dates.  Normal appearing nuchal " translucency without evidence of cystic hygroma.   FHT: 176 bpm.     ASSESSMENT/COUNSELIN y.o.   at  10 0/7 weeks gestation (Estimated Date of Delivery: 3/7/24).     -Pregnancy  [ X ] stable  [   ] improving [  ] worsening    Diagnoses and all orders for this visit:    1. Suspected fetal anomaly not found (Primary)         Cystic hygroma, not found:  Discussed that see the fluid filled area on the back of the baby's neck, but it looks very normal.  No evidence of cystic hygroma or other anomalies, but significantly limited by gestational age.  I am optimistic, but offered patient anatomy here at 18 weeks and she will follow up her cell free DNA screening.  We will do detailed anatomy, but was able to reassure her that I think everything we are seeing right now looks normal.      Summary of Plan  -Anatomy at [facility] 18 weeks    Follow-up: 18 week anatomy at [facility]    Thank you for the consult and opportunity to care for this patient.  Please feel free to reach out with any questions or concerns.      I spent 30 minutes caring for this patient on this date of service. This time includes time spent by me in the following activities: preparing for the visit, reviewing tests, obtaining and/or reviewing a separately obtained history, performing a medically appropriate examination and/or evaluation, counseling and educating the patient/family/caregiver and independently interpreting results and communicating that information with the patient/family/caregiver with greater than 50% spent in counseling and coordination of care.       I spent 5 minutes on the separately reported service of US imaging not included in the time used to support the E/M service also reported today.      Cyn Delatorre MD FACOG  Maternal Fetal Medicine-[facility]  Office: [phone]  [email or web address]

## 2023-08-10 NOTE — PROGRESS NOTES
Pt. Reports that she is doing well and denies vaginal bleeding, contractions or leaking of fluid at this time. Has cramping. Reports no active fetal movement.  Denies headache, visual changes or epigastric pain.  Denies any additional complaints at time of appointment.  Next OB appointment scheduled for 8/29/23.    Vitals:    08/10/23 1112   BP: 132/80   Pulse: 103   Temp: 99.8 øF (37.7 øC)

## 2023-08-10 NOTE — TELEPHONE ENCOUNTER
Spoke with pt and she was able to get the zofran but not the bonjesta.  We also talked about that the medication that she needed to take was her flagyl.  Pt understood.  Tried to do a PA on bonjesta and the medication is not approved.  Hanane will send in diclegis and see if they will cover.

## 2023-08-14 RX ORDER — DOXYLAMINE SUCCINATE AND PYRIDOXINE HYDROCHLORIDE, DELAYED RELEASE TABLETS 10 MG/10 MG 10; 10 MG/1; MG/1
TABLET, DELAYED RELEASE ORAL
Qty: 100 TABLET | Refills: 2 | Status: SHIPPED | OUTPATIENT
Start: 2023-08-14

## 2023-08-14 NOTE — TELEPHONE ENCOUNTER
Zofran is giving her a headache, and they pharmacy does not have the diclegis in stock at this time.    She wants to know what to do.

## 2023-08-15 ENCOUNTER — HOSPITAL ENCOUNTER (EMERGENCY)
Facility: HOSPITAL | Age: 33
Discharge: HOME | End: 2023-08-15
Attending: EMERGENCY MEDICINE
Payer: COMMERCIAL

## 2023-08-15 VITALS
RESPIRATION RATE: 18 BRPM | DIASTOLIC BLOOD PRESSURE: 68 MMHG | HEIGHT: 68 IN | HEART RATE: 84 BPM | TEMPERATURE: 98 F | BODY MASS INDEX: 21.22 KG/M2 | SYSTOLIC BLOOD PRESSURE: 113 MMHG | OXYGEN SATURATION: 100 % | WEIGHT: 140 LBS

## 2023-08-15 DIAGNOSIS — O21.0 HYPEREMESIS GRAVIDARUM: Primary | ICD-10-CM

## 2023-08-15 LAB
ALBUMIN SERPL-MCNC: 4.3 G/DL (ref 3.5–5.2)
ALBUMIN/GLOB SERPL: 1.7 G/DL
ALP SERPL-CCNC: 53 U/L (ref 39–117)
ALT SERPL W P-5'-P-CCNC: 12 U/L (ref 1–33)
AMORPH URATE CRY URNS QL MICRO: ABNORMAL /HPF
ANION GAP SERPL CALCULATED.3IONS-SCNC: 12 MMOL/L (ref 5–15)
AST SERPL-CCNC: 13 U/L (ref 1–32)
BACTERIA UR QL AUTO: ABNORMAL /HPF
BASOPHILS # BLD AUTO: 0.04 10*3/MM3 (ref 0–0.2)
BASOPHILS NFR BLD AUTO: 0.8 % (ref 0–1.5)
BILIRUB SERPL-MCNC: 0.2 MG/DL (ref 0–1.2)
BILIRUB UR QL STRIP: NEGATIVE
BUN SERPL-MCNC: 5 MG/DL (ref 6–20)
BUN/CREAT SERPL: 8.2 (ref 7–25)
CALCIUM SPEC-SCNC: 9.4 MG/DL (ref 8.6–10.5)
CHLORIDE SERPL-SCNC: 100 MMOL/L (ref 98–107)
CLARITY UR: CLEAR
CO2 SERPL-SCNC: 22 MMOL/L (ref 22–29)
COLOR UR: YELLOW
CREAT SERPL-MCNC: 0.61 MG/DL (ref 0.57–1)
DEPRECATED RDW RBC AUTO: 39.4 FL (ref 37–54)
EGFRCR SERPLBLD CKD-EPI 2021: 122.8 ML/MIN/1.73
EOSINOPHIL # BLD AUTO: 0.22 10*3/MM3 (ref 0–0.4)
EOSINOPHIL NFR BLD AUTO: 4.4 % (ref 0.3–6.2)
ERYTHROCYTE [DISTWIDTH] IN BLOOD BY AUTOMATED COUNT: 11.9 % (ref 12.3–15.4)
GLOBULIN UR ELPH-MCNC: 2.5 GM/DL
GLUCOSE SERPL-MCNC: 78 MG/DL (ref 65–99)
GLUCOSE UR STRIP-MCNC: NEGATIVE MG/DL
HCT VFR BLD AUTO: 36.8 % (ref 34–46.6)
HGB BLD-MCNC: 12.4 G/DL (ref 12–15.9)
HGB UR QL STRIP.AUTO: NEGATIVE
HYALINE CASTS UR QL AUTO: ABNORMAL /LPF
IMM GRANULOCYTES # BLD AUTO: 0.02 10*3/MM3 (ref 0–0.05)
IMM GRANULOCYTES NFR BLD AUTO: 0.4 % (ref 0–0.5)
KETONES UR QL STRIP: NEGATIVE
LEUKOCYTE ESTERASE UR QL STRIP.AUTO: ABNORMAL
LIPASE SERPL-CCNC: 12 U/L (ref 13–60)
LYMPHOCYTES # BLD AUTO: 1.18 10*3/MM3 (ref 0.7–3.1)
LYMPHOCYTES NFR BLD AUTO: 23.8 % (ref 19.6–45.3)
Lab: NORMAL
MCH RBC QN AUTO: 30.6 PG (ref 26.6–33)
MCHC RBC AUTO-ENTMCNC: 33.7 G/DL (ref 31.5–35.7)
MCV RBC AUTO: 90.9 FL (ref 79–97)
MONOCYTES # BLD AUTO: 0.48 10*3/MM3 (ref 0.1–0.9)
MONOCYTES NFR BLD AUTO: 9.7 % (ref 5–12)
NEUTROPHILS NFR BLD AUTO: 3.01 10*3/MM3 (ref 1.7–7)
NEUTROPHILS NFR BLD AUTO: 60.9 % (ref 42.7–76)
NITRITE UR QL STRIP: NEGATIVE
NRBC BLD AUTO-RTO: 0 /100 WBC (ref 0–0.2)
NTRA 1P36 DELETION SYNDROME POPULATION-BASED RISK TEXT: NORMAL
NTRA 1P36 DELETION SYNDROME RESULT TEXT: NORMAL
NTRA 1P36 DELETION SYNDROME RISK SCORE TEXT: NORMAL
NTRA 22Q11.2 DELETION SYNDROME POPULATION-BASED RISK TEXT: NORMAL
NTRA 22Q11.2 DELETION SYNDROME RESULT TEXT: NORMAL
NTRA 22Q11.2 DELETION SYNDROME RISK SCORE TEXT: NORMAL
NTRA ANGELMAN SYNDROME POPULATION-BASED RISK TEXT: NORMAL
NTRA ANGELMAN SYNDROME RESULT TEXT: NORMAL
NTRA ANGELMAN SYNDROME RISK SCORE TEXT: NORMAL
NTRA CRI-DU-CHAT SYNDROME POPULATION-BASED RISK TEXT: NORMAL
NTRA CRI-DU-CHAT SYNDROME RESULT TEXT: NORMAL
NTRA CRI-DU-CHAT SYNDROME RISK SCORE TEXT: NORMAL
NTRA FETAL FRACTION: NORMAL
NTRA GENDER OF FETUS: NORMAL
NTRA MONOSOMY X AGE-BASED RISK TEXT: NORMAL
NTRA MONOSOMY X RESULT TEXT: NORMAL
NTRA MONOSOMY X RISK SCORE TEXT: NORMAL
NTRA PRADER-WILLI SYNDROME POPULATION-BASED RISK TEXT: NORMAL
NTRA PRADER-WILLI SYNDROME RESULT TEXT: NORMAL
NTRA PRADER-WILLI SYNDROME RISK SCORE TEXT: NORMAL
NTRA TRIPLOIDY RESULT TEXT: NORMAL
NTRA TRISOMY 13 AGE-BASED RISK TEXT: NORMAL
NTRA TRISOMY 13 RESULT TEXT: NORMAL
NTRA TRISOMY 13 RISK SCORE TEXT: NORMAL
NTRA TRISOMY 18 AGE-BASED RISK TEXT: NORMAL
NTRA TRISOMY 18 RESULT TEXT: NORMAL
NTRA TRISOMY 18 RISK SCORE TEXT: NORMAL
NTRA TRISOMY 21 AGE-BASED RISK TEXT: NORMAL
NTRA TRISOMY 21 RESULT TEXT: NORMAL
NTRA TRISOMY 21 RISK SCORE TEXT: NORMAL
PH UR STRIP.AUTO: 6.5 [PH] (ref 5–8)
PLATELET # BLD AUTO: 213 10*3/MM3 (ref 140–450)
PMV BLD AUTO: 9.9 FL (ref 6–12)
POTASSIUM SERPL-SCNC: 3.7 MMOL/L (ref 3.5–5.2)
PROT SERPL-MCNC: 6.8 G/DL (ref 6–8.5)
PROT UR QL STRIP: NEGATIVE
RBC # BLD AUTO: 4.05 10*6/MM3 (ref 3.77–5.28)
RBC # UR STRIP: ABNORMAL /HPF
REF LAB TEST METHOD: ABNORMAL
SODIUM SERPL-SCNC: 134 MMOL/L (ref 136–145)
SP GR UR STRIP: 1.01 (ref 1–1.03)
SQUAMOUS #/AREA URNS HPF: ABNORMAL /HPF
STARCH GRANULES URNS QL MICRO: ABNORMAL /HPF
UROBILINOGEN UR QL STRIP: ABNORMAL
WBC # UR STRIP: ABNORMAL /HPF
WBC NRBC COR # BLD: 4.95 10*3/MM3 (ref 3.4–10.8)

## 2023-08-15 PROCEDURE — 25010000002 DIPHENHYDRAMINE PER 50 MG: Performed by: EMERGENCY MEDICINE

## 2023-08-15 PROCEDURE — 85025 COMPLETE CBC W/AUTO DIFF WBC: CPT | Performed by: EMERGENCY MEDICINE

## 2023-08-15 PROCEDURE — 80053 COMPREHEN METABOLIC PANEL: CPT | Performed by: EMERGENCY MEDICINE

## 2023-08-15 PROCEDURE — 96361 HYDRATE IV INFUSION ADD-ON: CPT

## 2023-08-15 PROCEDURE — 96374 THER/PROPH/DIAG INJ IV PUSH: CPT

## 2023-08-15 PROCEDURE — 99283 EMERGENCY DEPT VISIT LOW MDM: CPT

## 2023-08-15 PROCEDURE — 83690 ASSAY OF LIPASE: CPT | Performed by: EMERGENCY MEDICINE

## 2023-08-15 PROCEDURE — 25010000002 METOCLOPRAMIDE PER 10 MG: Performed by: EMERGENCY MEDICINE

## 2023-08-15 PROCEDURE — 81001 URINALYSIS AUTO W/SCOPE: CPT | Performed by: EMERGENCY MEDICINE

## 2023-08-15 RX ORDER — METOCLOPRAMIDE 10 MG/1
10 TABLET ORAL EVERY 8 HOURS PRN
Qty: 30 TABLET | Refills: 0 | Status: SHIPPED | OUTPATIENT
Start: 2023-08-15

## 2023-08-15 RX ORDER — DIPHENHYDRAMINE HYDROCHLORIDE 50 MG/ML
25 INJECTION INTRAMUSCULAR; INTRAVENOUS ONCE
Status: COMPLETED | OUTPATIENT
Start: 2023-08-15 | End: 2023-08-15

## 2023-08-15 RX ORDER — METOCLOPRAMIDE HYDROCHLORIDE 5 MG/ML
10 INJECTION INTRAMUSCULAR; INTRAVENOUS ONCE
Status: COMPLETED | OUTPATIENT
Start: 2023-08-15 | End: 2023-08-15

## 2023-08-15 RX ORDER — SODIUM CHLORIDE 0.9 % (FLUSH) 0.9 %
10 SYRINGE (ML) INJECTION AS NEEDED
Status: DISCONTINUED | OUTPATIENT
Start: 2023-08-15 | End: 2023-08-15 | Stop reason: HOSPADM

## 2023-08-15 RX ORDER — DEXTROSE, SODIUM CHLORIDE, SODIUM LACTATE, POTASSIUM CHLORIDE, AND CALCIUM CHLORIDE 5; .6; .31; .03; .02 G/100ML; G/100ML; G/100ML; G/100ML; G/100ML
999 INJECTION, SOLUTION INTRAVENOUS CONTINUOUS
Status: DISCONTINUED | OUTPATIENT
Start: 2023-08-15 | End: 2023-08-15 | Stop reason: HOSPADM

## 2023-08-15 RX ADMIN — SODIUM CHLORIDE, POTASSIUM CHLORIDE, SODIUM LACTATE AND CALCIUM CHLORIDE 1000 ML: 600; 310; 30; 20 INJECTION, SOLUTION INTRAVENOUS at 16:13

## 2023-08-15 RX ADMIN — SODIUM CHLORIDE, SODIUM LACTATE, POTASSIUM CHLORIDE, CALCIUM CHLORIDE AND DEXTROSE MONOHYDRATE 999 ML/HR: 5; 600; 310; 30; 20 INJECTION, SOLUTION INTRAVENOUS at 16:14

## 2023-08-15 RX ADMIN — METOCLOPRAMIDE 10 MG: 5 INJECTION, SOLUTION INTRAMUSCULAR; INTRAVENOUS at 16:54

## 2023-08-15 RX ADMIN — DIPHENHYDRAMINE HYDROCHLORIDE 25 MG: 50 INJECTION, SOLUTION INTRAMUSCULAR; INTRAVENOUS at 16:54

## 2023-08-15 NOTE — Clinical Note
[facility] EMERGENCY DEPARTMENT  [address]  [facility] [address]  Phone: [phone]    Keri Pool was seen and treated in our emergency department on 8/15/2023.  She may return to work on 08/17/2023.         Thank you for choosing [facility].    Bert Ballesteros MD

## 2023-08-15 NOTE — ED TRIAGE NOTES
Patient ambulatory to triage. Patient states she is 9 weeks pregnant, has been vomiting for about 4 weeks but worsened over the past few days. Patient denies abdominal pain, reports feeling weak. Patient states she has been unable to tolerate anything PO.

## 2023-08-15 NOTE — ED PROVIDER NOTES
EMERGENCY DEPARTMENT ENCOUNTER    Room Number:  22/22  PCP: Provider, No Known  Historian: Patient      HPI:  Chief Complaint: Nausea vomiting  A complete HPI/ROS/PMH/PSH/SH/FH are unobtainable due to: Nothing  Context: Keri Pool is a 32 y.o. female who presents to the ED c/o nausea and vomiting.  Patient reports that she is about 9 weeks pregnant.  Symptoms been ongoing for 4 weeks now and getting worse.  He was taking Phenergan and then Zofran without relief.  She reports that both these medications tend to make her dizzy.  Her OB/GYN, Dr. Coles, recently prescribed another medication that starts with a D but she cannot recall the name of it.  She states that her emesis now looks like bile.  She has not had much to eat or drink because she gets sick when she tries to eat.  She also reports decreased urine output.  No vaginal bleeding.  No abdominal pain.  She had a little bit of diarrhea last night.    Patient experienced hyperemesis with her first 2 pregnancies also.        PAST MEDICAL HISTORY  Active Ambulatory Problems     Diagnosis Date Noted    No Active Ambulatory Problems     Resolved Ambulatory Problems     Diagnosis Date Noted    No Resolved Ambulatory Problems     Past Medical History:   Diagnosis Date    ASCUS with positive high risk HPV cervical     Chlamydia     Gallbladder disease     Herpes simplex type 2 infection     Vaginal delivery          PAST SURGICAL HISTORY  Past Surgical History:   Procedure Laterality Date    CHOLECYSTECTOMY      ENDOSCOPY N/A 1/10/2017    Procedure: ESOPHAGOGASTRODUODENOSCOPY WITH BX;  Surgeon: Dre Cunningham MD;  Location: [facility] ENDOSCOPY;  Service:          FAMILY HISTORY  Family History   Problem Relation Age of Onset    Heart disease Other         FH of heart disease in males before age 55         SOCIAL HISTORY  Social History     Socioeconomic History    Marital status: Single   Tobacco Use    Smoking status: Never     Passive exposure: Never     Smokeless tobacco: Never   Vaping Use    Vaping Use: Never used   Substance and Sexual Activity    Alcohol use: No    Drug use: Yes     Frequency: 3.0 times per week     Types: Marijuana    Sexual activity: Yes         ALLERGIES  Sulfa antibiotics        REVIEW OF SYSTEMS  Review of Systems   Review of all 14 systems is negative other than stated in the HPI above.      PHYSICAL EXAM  ED Triage Vitals   Temp Heart Rate Resp BP SpO2   08/15/23 1533 08/15/23 1533 08/15/23 1533 08/15/23 1538 08/15/23 1533   98 øF (36.7 øC) (!) 135 18 137/85 100 %      Temp src Heart Rate Source Patient Position BP Location FiO2 (%)   08/15/23 1533 08/15/23 1533 08/15/23 1538 08/15/23 1538 --   Tympanic Monitor Lying Right arm        Physical Exam      GENERAL: Awake and alert, no acute distress  HENT: nares patent  EYES: no scleral icterus  CV: regular rhythm, normal rate  RESPIRATORY: normal effort  ABDOMEN: soft, nondistended, nontender throughout  MUSCULOSKELETAL: no deformity  NEURO: alert, moves all extremities, follows commands, cranial nerves II through XII grossly intact with speech fluent and clear  PSYCH:  calm, cooperative  SKIN: warm, dry    Vital signs and nursing notes reviewed.          LAB RESULTS  Recent Results (from the past 24 hour(s))   Comprehensive Metabolic Panel    Collection Time: 08/15/23  4:05 PM    Specimen: Blood   Result Value Ref Range    Glucose 78 65 - 99 mg/dL    BUN 5 (L) 6 - 20 mg/dL    Creatinine 0.61 0.57 - 1.00 mg/dL    Sodium 134 (L) 136 - 145 mmol/L    Potassium 3.7 3.5 - 5.2 mmol/L    Chloride 100 98 - 107 mmol/L    CO2 22.0 22.0 - 29.0 mmol/L    Calcium 9.4 8.6 - 10.5 mg/dL    Total Protein 6.8 6.0 - 8.5 g/dL    Albumin 4.3 3.5 - 5.2 g/dL    ALT (SGPT) 12 1 - 33 U/L    AST (SGOT) 13 1 - 32 U/L    Alkaline Phosphatase 53 39 - 117 U/L    Total Bilirubin 0.2 0.0 - 1.2 mg/dL    Globulin 2.5 gm/dL    A/G Ratio 1.7 g/dL    BUN/Creatinine Ratio 8.2 7.0 - 25.0    Anion Gap 12.0 5.0 - 15.0 mmol/L     eGFR 122.8 >60.0 mL/min/1.73   Lipase    Collection Time: 08/15/23  4:05 PM    Specimen: Blood   Result Value Ref Range    Lipase 12 (L) 13 - 60 U/L   CBC Auto Differential    Collection Time: 08/15/23  4:05 PM    Specimen: Blood   Result Value Ref Range    WBC 4.95 3.40 - 10.80 10*3/mm3    RBC 4.05 3.77 - 5.28 10*6/mm3    Hemoglobin 12.4 12.0 - 15.9 g/dL    Hematocrit 36.8 34.0 - 46.6 %    MCV 90.9 79.0 - 97.0 fL    MCH 30.6 26.6 - 33.0 pg    MCHC 33.7 31.5 - 35.7 g/dL    RDW 11.9 (L) 12.3 - 15.4 %    RDW-SD 39.4 37.0 - 54.0 fl    MPV 9.9 6.0 - 12.0 fL    Platelets 213 140 - 450 10*3/mm3    Neutrophil % 60.9 42.7 - 76.0 %    Lymphocyte % 23.8 19.6 - 45.3 %    Monocyte % 9.7 5.0 - 12.0 %    Eosinophil % 4.4 0.3 - 6.2 %    Basophil % 0.8 0.0 - 1.5 %    Immature Grans % 0.4 0.0 - 0.5 %    Neutrophils, Absolute 3.01 1.70 - 7.00 10*3/mm3    Lymphocytes, Absolute 1.18 0.70 - 3.10 10*3/mm3    Monocytes, Absolute 0.48 0.10 - 0.90 10*3/mm3    Eosinophils, Absolute 0.22 0.00 - 0.40 10*3/mm3    Basophils, Absolute 0.04 0.00 - 0.20 10*3/mm3    Immature Grans, Absolute 0.02 0.00 - 0.05 10*3/mm3    nRBC 0.0 0.0 - 0.2 /100 WBC   Urinalysis With Microscopic If Indicated (No Culture) - Urine, Clean Catch    Collection Time: 08/15/23  4:20 PM    Specimen: Urine, Clean Catch   Result Value Ref Range    Color, UA Yellow Yellow, Straw    Appearance, UA Clear Clear    pH, UA 6.5 5.0 - 8.0    Specific Gravity, UA 1.007 1.005 - 1.030    Glucose, UA Negative Negative    Ketones, UA Negative Negative    Bilirubin, UA Negative Negative    Blood, UA Negative Negative    Protein, UA Negative Negative    Leuk Esterase, UA Small (1+) (A) Negative    Nitrite, UA Negative Negative    Urobilinogen, UA 0.2 E.U./dL 0.2 - 1.0 E.U./dL   Urinalysis, Microscopic Only - Urine, Clean Catch    Collection Time: 08/15/23  4:20 PM    Specimen: Urine, Clean Catch   Result Value Ref Range    RBC, UA None Seen None Seen, 0-2 /HPF    WBC, UA 0-2 None Seen, 0-2  /HPF    Bacteria, UA Trace (A) None Seen /HPF    Squamous Epithelial Cells, UA 0-2 None Seen, 0-2 /HPF    Hyaline Casts, UA None Seen None Seen /LPF    Amorphous Crystals, UA Moderate/2+ None Seen /HPF    Starch, UA Small/1+ None Seen /HPF    Methodology Manual Light Microscopy        Ordered the above labs and reviewed the results.        RADIOLOGY  No Radiology Exams Resulted Within Past 24 Hours    Ordered the above noted radiological studies. Reviewed by me in PACS.            PROCEDURES  Procedures            MEDICATIONS GIVEN IN ER  Medications   sodium chloride 0.9 % flush 10 mL (has no administration in time range)   dextrose 5 % and lactated Ringer's infusion (0 mL/hr Intravenous Stopped 8/15/23 1850)   lactated ringers bolus 1,000 mL (0 mL Intravenous Stopped 8/15/23 1850)   metoclopramide (REGLAN) injection 10 mg (10 mg Intravenous Given 8/15/23 1654)   diphenhydrAMINE (BENADRYL) injection 25 mg (25 mg Intravenous Given 8/15/23 1654)                   MEDICAL DECISION MAKING, PROGRESS, and CONSULTS    All labs have been independently reviewed by me.  All radiology studies have been reviewed by me and I have also reviewed the radiology report.   EKG's independently viewed and interpreted by me.  Discussion below represents my analysis of pertinent findings related to patient's condition, differential diagnosis, treatment plan and final disposition.      Additional sources:  - Discussed/ obtained information from independent historians: N/A    - External (non-ED) record review: I reviewed maternal-fetal medicine progress note with Dr. Delatorre from 8/10/2023 where patient was seen for possible fetal anomaly.    - Chronic or social conditions impacting care: N/A    - Shared decision making: N/A      Orders placed during this visit:  Orders Placed This Encounter   Procedures    Comprehensive Metabolic Panel    Lipase    Urinalysis With Microscopic If Indicated (No Culture) - Urine, Clean Catch    CBC Auto  Differential    Urinalysis, Microscopic Only - Urine, Clean Catch    Pulse Oximetry, Continuous    Monitor Blood Pressure    Insert Peripheral IV    CBC & Differential           Differential diagnosis includes but is not limited to:    Hyperemesis gravidarum  Gastroenteritis  Small bowel obstruction  Cholecystitis  Pancreatitis        Independent interpretation of labs, radiology studies, and discussions with consultants:  ED Course as of 08/15/23 2007   Tue Aug 15, 2023   1722 Ketones, UA: Negative [JR]   1723 Hemoglobin: 12.4 [JR]   1723 Glucose: 78 [JR]   1723 Potassium: 3.7 [JR]   1723 WBC, UA: 0-2 [JR]   1748 Patient's labs appear reassuring.  Specifically she has no urine ketones, LFTs and lipase within normal limits.  She was given IV fluids as well as IV fluids containing dextrose.  She reports intolerance of Zofran and Phenergan.  I recommended a trial of Reglan.  It appears that her OB/GYN had also prescribed Diclegis.  She was instructed to follow-up closely with her OB/GYN and return precautions were discussed. [JR]   1836 Patient reassessed just prior to discharge.  She reports that she is hungry and she wants to go home and eat some soup.  I discussed plan to trial Reglan and follow-up closely. [JR]      ED Course User Index  [JR] Bert Ballesteros MD           DIAGNOSIS  Final diagnoses:   Hyperemesis gravidarum         DISPOSITION  DISCHARGE    Patient discharged in stable condition.    Reviewed implications of results, diagnosis, meds, responsibility to follow up, warning signs and symptoms of possible worsening, potential complications and reasons to return to ER.    Patient/Family voiced understanding of above instructions.    Discussed plan for discharge, as there is no emergent indication for admission. Patient referred to primary care provider for BP management due to today's BP. Pt/family is agreeable and understands need for follow up and repeat testing.  Pt is aware that discharge does  not mean that nothing is wrong but it indicates no emergency is present that requires admission and they must continue care with follow-up as given below or physician of their choice.     FOLLOW-UP  Guillaume French MD  [address] [facility]  [address] [place] [address] [phone]    Schedule an appointment as soon as possible for a visit            Medication List        New Prescriptions      metoclopramide 10 MG tablet  Commonly known as: REGLAN  Take 1 tablet by mouth Every 8 (Eight) Hours As Needed (nausea).            Changed      valACYclovir 500 MG tablet  Commonly known as: VALTREX  Take 1 tablet by mouth 2 (Two) Times a Day.  What changed:   when to take this  reasons to take this               Where to Get Your Medications        These medications were sent to [id number] - [place], [place] - [address] AT [facility] - [phone]  - [phone]   [address] [facility] [address]      Phone: [phone]   metoclopramide 10 MG tablet                   Latest Documented Vital Signs:  As of 20:07 EDT  BP- 113/68 HR- 84 Temp- 98 øF (36.7 øC) (Tympanic) O2 sat- 100%              --    Please note that portions of this were completed with a voice recognition program.       Note Disclaimer: At [facility], we believe that sharing information builds trust and better relationships. You are receiving this note because you are receiving care at [facility] or recently visited. It is possible you will see health information before a provider has talked with you about it. This kind of information can be easy to misunderstand. To help you fully understand what it means for your health, we urge you to discuss this note with your provider.             Bert Ballesteros MD  08/15/23 1751       Bert Ballesteros MD  08/15/23 2007

## 2023-08-20 LAB
Lab: ABNORMAL
Lab: POSITIVE
NTRA ALPHA-THALASSEMIA: NEGATIVE
NTRA BETA-HEMOGLOBINOPATHIES: NEGATIVE
NTRA CANAVAN DISEASE: NEGATIVE
NTRA CYSTIC FIBROSIS: NEGATIVE
NTRA DUCHENNE/BECKER MUSCULAR DYSTROPHY: NEGATIVE
NTRA FAMILIAL DYSAUTONOMIA: NEGATIVE
NTRA FRAGILE X SYNDROME: NEGATIVE
NTRA GALACTOSEMIA: NEGATIVE
NTRA GAUCHER DISEASE: NEGATIVE
NTRA MEDIUM CHAIN ACYL-COA DEHYDROGENASE DEFICIENCY: NEGATIVE
NTRA POLYCYSTIC KIDNEY DISEASE, AUTOSOMAL RECESSIVE: NEGATIVE
NTRA SMITH-LEMLI-OPITZ SYNDROME: NEGATIVE
NTRA SPINAL MUSCULAR ATROPHY: POSITIVE
NTRA TAY-SACHS DISEASE: NEGATIVE

## 2023-08-21 LAB — T VAGINALIS DNA VAG QL PROBE+SIG AMP: NORMAL

## 2023-08-22 ENCOUNTER — TELEPHONE (OUTPATIENT)
Dept: OBSTETRICS AND GYNECOLOGY | Age: 33
End: 2023-08-22
Payer: COMMERCIAL

## 2023-09-22 DIAGNOSIS — Z03.73 SUSPECTED FETAL ANOMALY NOT FOUND: Primary | ICD-10-CM

## 2023-10-06 ENCOUNTER — TELEPHONE (OUTPATIENT)
Dept: ULTRASOUND IMAGING | Facility: HOSPITAL | Age: 33
End: 2023-10-06
Payer: COMMERCIAL

## 2024-06-18 ENCOUNTER — APPOINTMENT (OUTPATIENT)
Dept: GENERAL RADIOLOGY | Facility: HOSPITAL | Age: 34
End: 2024-06-18
Payer: COMMERCIAL

## 2024-06-18 ENCOUNTER — HOSPITAL ENCOUNTER (EMERGENCY)
Facility: HOSPITAL | Age: 34
Discharge: HOME | End: 2024-06-18
Attending: EMERGENCY MEDICINE | Admitting: EMERGENCY MEDICINE
Payer: COMMERCIAL

## 2024-06-18 VITALS
WEIGHT: 139.99 LBS | SYSTOLIC BLOOD PRESSURE: 111 MMHG | OXYGEN SATURATION: 100 % | HEIGHT: 68 IN | RESPIRATION RATE: 18 BRPM | DIASTOLIC BLOOD PRESSURE: 75 MMHG | HEART RATE: 83 BPM | TEMPERATURE: 98.6 F | BODY MASS INDEX: 21.22 KG/M2

## 2024-06-18 DIAGNOSIS — M54.6 ACUTE MIDLINE THORACIC BACK PAIN: Primary | ICD-10-CM

## 2024-06-18 PROCEDURE — 96372 THER/PROPH/DIAG INJ SC/IM: CPT

## 2024-06-18 PROCEDURE — 63710000001 ONDANSETRON ODT 4 MG TABLET DISPERSIBLE: Performed by: EMERGENCY MEDICINE

## 2024-06-18 PROCEDURE — 99283 EMERGENCY DEPT VISIT LOW MDM: CPT

## 2024-06-18 PROCEDURE — 25010000002 KETOROLAC TROMETHAMINE PER 15 MG: Performed by: EMERGENCY MEDICINE

## 2024-06-18 PROCEDURE — 71046 X-RAY EXAM CHEST 2 VIEWS: CPT

## 2024-06-18 PROCEDURE — 72072 X-RAY EXAM THORAC SPINE 3VWS: CPT

## 2024-06-18 PROCEDURE — 25010000002 HYDROMORPHONE 1 MG/ML SOLUTION: Performed by: EMERGENCY MEDICINE

## 2024-06-18 RX ORDER — IBUPROFEN 600 MG/1
600 TABLET ORAL EVERY 6 HOURS PRN
Qty: 20 TABLET | Refills: 0 | Status: SHIPPED | OUTPATIENT
Start: 2024-06-18

## 2024-06-18 RX ORDER — KETOROLAC TROMETHAMINE 30 MG/ML
30 INJECTION, SOLUTION INTRAMUSCULAR; INTRAVENOUS ONCE
Status: COMPLETED | OUTPATIENT
Start: 2024-06-18 | End: 2024-06-18

## 2024-06-18 RX ORDER — ONDANSETRON 4 MG/1
4 TABLET, ORALLY DISINTEGRATING ORAL ONCE
Status: COMPLETED | OUTPATIENT
Start: 2024-06-18 | End: 2024-06-18

## 2024-06-18 RX ORDER — CYCLOBENZAPRINE HCL 10 MG
10 TABLET ORAL 3 TIMES DAILY PRN
Qty: 20 TABLET | Refills: 0 | Status: SHIPPED | OUTPATIENT
Start: 2024-06-18

## 2024-06-18 RX ADMIN — ONDANSETRON 4 MG: 4 TABLET, ORALLY DISINTEGRATING ORAL at 19:35

## 2024-06-18 RX ADMIN — HYDROMORPHONE HYDROCHLORIDE 1 MG: 1 INJECTION, SOLUTION INTRAMUSCULAR; INTRAVENOUS; SUBCUTANEOUS at 19:35

## 2024-06-18 RX ADMIN — KETOROLAC TROMETHAMINE 30 MG: 30 INJECTION, SOLUTION INTRAMUSCULAR at 19:35

## 2024-06-18 NOTE — ED PROVIDER NOTES
EMERGENCY DEPARTMENT ENCOUNTER    Room Number:  HB1/C  PCP: Provider, No Known  Historian: Patient      HPI:  Chief Complaint: Back pain  A complete HPI/ROS/PMH/PSH/SH/FH are unobtainable due to: None  Context: Keri Pool is a 33 y.o. female who presents to the ED c/o back pain.  Patient states she had injury back in 2021 and has been having intermittent problems with her back in the past.  Patient states she woke up today and states she thought she slept wrong.  Patient having pain mid back.  States it radiates to the right side.  Definitely worse with movement.  Better if she is completely still.  Patient has had no direct trauma.  No bowel or bladder issues.  No difficulty breathing.            PAST MEDICAL HISTORY  Active Ambulatory Problems     Diagnosis Date Noted    No Active Ambulatory Problems     Resolved Ambulatory Problems     Diagnosis Date Noted    No Resolved Ambulatory Problems     Past Medical History:   Diagnosis Date    ASCUS with positive high risk HPV cervical     Chlamydia     Gallbladder disease     Herpes simplex type 2 infection     Vaginal delivery          PAST SURGICAL HISTORY  Past Surgical History:   Procedure Laterality Date    CHOLECYSTECTOMY      ENDOSCOPY N/A 1/10/2017    Procedure: ESOPHAGOGASTRODUODENOSCOPY WITH BX;  Surgeon: Dre Cunningham MD;  Location: [facility] ENDOSCOPY;  Service:          FAMILY HISTORY  Family History   Problem Relation Age of Onset    Heart disease Other         FH of heart disease in males before age 55         SOCIAL HISTORY  Social History     Socioeconomic History    Marital status: Single   Tobacco Use    Smoking status: Never     Passive exposure: Never    Smokeless tobacco: Never   Vaping Use    Vaping status: Never Used   Substance and Sexual Activity    Alcohol use: No    Drug use: Yes     Frequency: 3.0 times per week     Types: Marijuana    Sexual activity: Yes         ALLERGIES  Sulfa antibiotics        REVIEW OF SYSTEMS  Review of  Systems   Back pain      PHYSICAL EXAM  ED Triage Vitals [06/18/24 1822]   Temp Heart Rate Resp BP SpO2   98.6 °F (37 °C) 83 18 111/75 100 %      Temp src Heart Rate Source Patient Position BP Location FiO2 (%)   -- -- -- -- --       Physical Exam      GENERAL: no acute distress  HENT: nares patent  EYES: no scleral icterus  CV: regular rhythm, normal rate  RESPIRATORY: normal effort  ABDOMEN: soft  MUSCULOSKELETAL: no deformity.  Tenderness upper back.  Worse with movement  NEURO: alert, moves all extremities, follows commands  PSYCH:  calm, cooperative  SKIN: warm, dry    Vital signs and nursing notes reviewed.          LAB RESULTS  No results found for this or any previous visit (from the past 24 hour(s)).          RADIOLOGY  XR Spine Thoracic 3 View, XR Chest 2 View    Result Date: 6/18/2024  XR SPINE THORACIC 3 VW-, XR CHEST 2 VW-  DATE OF EXAM: 6/18/2024 7:56 PM  INDICATION: back pain.  COMPARISON: None available.  TECHNIQUE: PA and lateral views of the chest were obtained. 3 views of the thoracic spine were obtained.  FINDINGS: Chest: The lungs are well expanded and clear. No pneumothorax. Cardiomediastinal contours within normal limits. Mild multilevel mid thoracic spondylosis. No acute osseous abnormality is identified. Cholecystectomy clips.  Thoracic spine: The upper thoracic vertebral bodies are partially obscured on the lateral and swimmer's views. Minimal upper thoracic levoscoliotic curvature. Mild multilevel mid thoracic spondylosis. The visualized thoracic vertebral bodies otherwise demonstrate well-preserved height and alignment. No evidence of acute fracture or subluxation of the thoracic spine. The included lungs are clear.       1. No active cardiopulmonary disease. 2. Mild upper thoracic levoscoliosis and mild multilevel mid thoracic spondylosis, without radiographic evidence of acute fracture or subluxation of the thoracic spine.  This report was finalized on 6/18/2024 8:47 PM by Tonny Zamora  MD on Workstation: [id number]       Ordered the above noted radiological studies.  X-ray of chest shows no evidence of pneumothorax          PROCEDURES  Procedures          MEDICATIONS GIVEN IN ER  Medications   HYDROmorphone (DILAUDID) injection 1 mg (1 mg Intramuscular Given 6/18/24 1935)   ketorolac (TORADOL) injection 30 mg (30 mg Intramuscular Given 6/18/24 1935)   ondansetron ODT (ZOFRAN-ODT) disintegrating tablet 4 mg (4 mg Oral Given 6/18/24 1935)                   MEDICAL DECISION MAKING, PROGRESS, and CONSULTS    All labs have been independently reviewed by me.  All radiology studies have been reviewed by me and I have also reviewed the radiology report.   EKG's independently viewed and interpreted by me.  Discussion below represents my analysis of pertinent findings related to patient's condition, differential diagnosis, treatment plan and final disposition.      Additional sources:  - Discussed/ obtained information from independent historians: None    - External (non-ED) record review: Epic reviewed and patient seen 2/13/2024 for pharyngitis    - Chronic or social conditions impacting care: None    - Shared decision making: None      Orders placed during this visit:  Orders Placed This Encounter   Procedures    XR Spine Thoracic 3 View    XR Chest 2 View         Additional orders considered but not ordered:  None        Differential diagnosis includes but is not limited to:    Musculoskeletal back pain versus fracture versus contusion      Independent interpretation of labs, radiology studies, and discussions with consultants:  ED Course as of 06/18/24 2147 Tue Jun 18, 2024 2117 21:17 EDT  Patient presents for evaluation of back pain.  Patient appears to have muscle strain.  Patient's x-rays look fine.  Patient's pain is better when not moving worse with movement.  Patient will be discharged home.  Anti-inflammatories muscle relaxer [SL]      ED Course User Index  [SL] Anatoly Tobar MD                DIAGNOSIS  Final diagnoses:   Acute midline thoracic back pain         DISPOSITION  DISCHARGE    Patient discharged in stable condition.    Reviewed implications of results, diagnosis, meds, responsibility to follow up, warning signs and symptoms of possible worsening, potential complications and reasons to return to ER, including worsening symptoms    Patient/Family voiced understanding of above instructions.    Discussed plan for discharge, as there is no emergent indication for admission. Patient referred to primary care provider for BP management due to today's BP. Pt/family is agreeable and understands need for follow up and repeat testing.  Pt is aware that discharge does not mean that nothing is wrong but it indicates no emergency is present that requires admission and they must continue care with follow-up as given below or physician of their choice.     FOLLOW-UP  PATIENT CONNECTION - [place] [address]  [phone]  Schedule an appointment as soon as possible for a visit            Medication List        New Prescriptions      cyclobenzaprine 10 MG tablet  Commonly known as: FLEXERIL  Take 1 tablet by mouth 3 (Three) Times a Day As Needed for Muscle Spasms.     ibuprofen 600 MG tablet  Commonly known as: ADVIL,MOTRIN  Take 1 tablet by mouth Every 6 (Six) Hours As Needed for Moderate Pain.            Changed      valACYclovir 500 MG tablet  Commonly known as: VALTREX  Take 1 tablet by mouth 2 (Two) Times a Day.  What changed:   when to take this  reasons to take this               Where to Get Your Medications        These medications were sent to [id number] - [place], [place] - [address] AT [facility] - [phone]  - [phone] fx [address] [facility] [address]      Phone: [phone]   cyclobenzaprine 10 MG tablet  ibuprofen 600 MG tablet                  Latest Documented Vital Signs:  As of 21:47 EDT  BP- 111/75  HR- 83 Temp- 98.6 °F (37 °C) O2 sat- 100%              --    Please note that portions of this were completed with a voice recognition program.       Note Disclaimer: At [facility], we believe that sharing information builds trust and better relationships. You are receiving this note because you are receiving care at [facility] or recently visited. It is possible you will see health information before a provider has talked with you about it. This kind of information can be easy to misunderstand. To help you fully understand what it means for your health, we urge you to discuss this note with your provider.            Anatoly Tobar MD  06/18/24 2830

## 2024-06-18 NOTE — ED TRIAGE NOTES
Pt from work via ems, called for back pain that started about 3 hours ago, reports injury in 2021 that causes back pain

## 2024-09-05 ENCOUNTER — APPOINTMENT (OUTPATIENT)
Dept: GENERAL RADIOLOGY | Facility: HOSPITAL | Age: 34
End: 2024-09-05
Payer: COMMERCIAL

## 2024-09-05 ENCOUNTER — HOSPITAL ENCOUNTER (EMERGENCY)
Facility: HOSPITAL | Age: 34
Discharge: HOME | End: 2024-09-06
Attending: EMERGENCY MEDICINE
Payer: COMMERCIAL

## 2024-09-05 DIAGNOSIS — K59.00 CONSTIPATION, UNSPECIFIED CONSTIPATION TYPE: Primary | ICD-10-CM

## 2024-09-05 PROCEDURE — 74022 RADEX COMPL AQT ABD SERIES: CPT

## 2024-09-05 PROCEDURE — 99284 EMERGENCY DEPT VISIT MOD MDM: CPT

## 2024-09-05 NOTE — Clinical Note
[facility] EMERGENCY DEPARTMENT  [address]  [place] [address]  Phone: [phone]    Keri Pool was seen and treated in our emergency department on 9/5/2024.  She may return to work on 09/07/2024.         Thank you for choosing [facility].    Juan Ramon Manzanares MD

## 2024-09-06 VITALS
SYSTOLIC BLOOD PRESSURE: 120 MMHG | WEIGHT: 150 LBS | BODY MASS INDEX: 22.73 KG/M2 | OXYGEN SATURATION: 100 % | TEMPERATURE: 98.4 F | DIASTOLIC BLOOD PRESSURE: 70 MMHG | HEIGHT: 68 IN | RESPIRATION RATE: 18 BRPM | HEART RATE: 106 BPM

## 2024-09-06 RX ORDER — POLYETHYLENE GLYCOL 3350 17 G/17G
17 POWDER, FOR SOLUTION ORAL DAILY
Qty: 14 EACH | Refills: 0 | Status: SHIPPED | OUTPATIENT
Start: 2024-09-06

## 2024-09-06 NOTE — ED NOTES
Patient ambulatory to ED stating that she is very constipated. Last BM was 3 days ago. Denies N/V/D. Denies fever/ chills. Patient does have generalized abdominal pain.

## 2024-09-06 NOTE — ED PROVIDER NOTES
EMERGENCY DEPARTMENT ENCOUNTER    Room Number:  19/19  Date seen:  9/6/2024  PCP: Provider, No Known  Historian: Patient      HPI:  Chief Complaint: Constipation  Context: Keri Pool is a 33 y.o. female who presents to the ED c/o not having a bowel movement in 3 days.  The patient states she is taken medicine by mouth and a laxative without improvement.  She states the only time this has happened to her previously is after her gallbladder surgery.  The patient denies nausea or vomiting.  She denies fevers or chills.  She states she is not having abdominal pain but she just has some pain in the rectum where she feels like she needs to go.      PAST MEDICAL HISTORY  Active Ambulatory Problems     Diagnosis Date Noted    No Active Ambulatory Problems     Resolved Ambulatory Problems     Diagnosis Date Noted    No Resolved Ambulatory Problems     Past Medical History:   Diagnosis Date    ASCUS with positive high risk HPV cervical     Chlamydia     Gallbladder disease     Herpes simplex type 2 infection     Vaginal delivery          REVIEW OF SYSTEMS  All systems reviewed and negative except for those discussed in HPI.       PAST SURGICAL HISTORY  Past Surgical History:   Procedure Laterality Date    CHOLECYSTECTOMY      ENDOSCOPY N/A 1/10/2017    Procedure: ESOPHAGOGASTRODUODENOSCOPY WITH BX;  Surgeon: Dre Cunningham MD;  Location: [facility] ENDOSCOPY;  Service:          FAMILY HISTORY  Family History   Problem Relation Age of Onset    Heart disease Other         FH of heart disease in males before age 55         SOCIAL HISTORY  Social History     Socioeconomic History    Marital status: Single   Tobacco Use    Smoking status: Never     Passive exposure: Never    Smokeless tobacco: Never   Vaping Use    Vaping status: Never Used   Substance and Sexual Activity    Alcohol use: No    Drug use: Yes     Frequency: 3.0 times per week     Types: Marijuana    Sexual activity: Yes         ALLERGIES  Sulfa  antibiotics      PHYSICAL EXAM  ED Triage Vitals   Temp Heart Rate Resp BP SpO2   09/05/24 2250 09/05/24 2250 09/05/24 2250 09/05/24 2253 09/05/24 2250   98.4 °F (36.9 °C) 106 16 139/92 100 %      Temp src Heart Rate Source Patient Position BP Location FiO2 (%)   09/05/24 2250 09/05/24 2250 09/05/24 2253 09/05/24 2253 --   Tympanic Monitor Lying Right arm        Physical Exam      GENERAL: 33-year-old female patient in no acute distress  HENT: NCAT: nares patent: Neck supple  EYES: no scleral icterus  ABDOMEN: soft, NTND: Bowel sounds diminished  MUSCULOSKELETAL: no deformity  NEURO: alert with nonfocal neuro exam  PSYCH:  calm, cooperative  SKIN: warm, dry    Vital signs and nursing notes reviewed.      LAB RESULTS  No results found for this or any previous visit (from the past 24 hour(s)).    Ordered the above labs and reviewed the results.        RADIOLOGY  XR Abdomen 2+ VW with Chest 1 VW    Result Date: 9/5/2024  FLAT AND UPRIGHT ABDOMINAL FILMS  HISTORY: Constipation  COMPARISON: None available.  FINDINGS: Heart size is within normal limits. No pneumothorax or pleural effusion is seen. No acute infiltrates are identified. No free air is seen beneath the diaphragm. There is no evidence of small bowel obstruction. The patient does have a large volume of stool within the rectal vault, suggesting fecal impaction. Cholecystectomy clips are noted.      Increased stool burden within the rectal vault, concerning for fecal impaction.  This report was finalized on 9/5/2024 11:47 PM by Dr. Danna Salamanca M.D on Workstation: [id number]       Ordered the above noted radiological studies. Reviewed by me in PACS.            PROCEDURES  Procedures          MEDICATIONS GIVEN IN ER  Medications - No data to display          MEDICAL DECISION MAKING, PROGRESS, and CONSULTS    All labs have been independently reviewed by me.  All radiology studies have been reviewed by me and I have also reviewed the radiology report.   EKG's  independently viewed and interpreted by me.  Discussion below represents my analysis of pertinent findings related to patient's condition, differential diagnosis, treatment plan and final disposition.          Orders placed during this visit:  Orders Placed This Encounter   Procedures    XR Abdomen 2+ VW with Chest 1 VW    Soap suds enema         Differential diagnosis:  My differential diagnosis includes but is not limited to constipation, fecal impaction, bowel obstruction or IBS      Independent interpretation of labs, radiology studies, and discussions with consultants:  ED Course as of 09/06/24 0107   Thu Sep 05, 2024   2307 The patient states she is not pregnant and thus we will check abdominal series for further evaluation. [GP]   2347 My independent interpretation of the patient's abdominal series is does appears like she has fecalization of stool in her rectum with a paucity of gas but no bowel obstruction.  We will give the patient an enema. [GP]   Fri Sep 06, 2024   0107 The patient was given a soapsuds enema with good relief.  At this point she is stable for discharge home.  I will place her on MiraLAX daily and increase fiber in diet.  The patient understands and agrees with plan. [GP]      ED Course User Index  [GP] Juan Ramon Manzanares MD               DIAGNOSIS  Final diagnoses:   Constipation, unspecified constipation type         DISPOSITION  Discharged            Latest Documented Vital Signs:  As of 01:07 EDT  BP- 120/70 HR- 106 Temp- 98.4 °F (36.9 °C) (Tympanic) O2 sat- 100%--      --------------------  Please note that portions of this were completed with a voice recognition program.       Note Disclaimer: At [facility], we believe that sharing information builds trust and better relationships. You are receiving this note because you are receiving care at [facility] or recently visited. It is possible you will see health information before a provider has talked with you about it. This kind  of information can be easy to misunderstand. To help you fully understand what it means for your health, we urge you to discuss this note with your provider.             Juan Ramon Manzanares MD  09/06/24 0107

## 2024-09-06 NOTE — DISCHARGE INSTRUCTIONS
Liquid diet for the next 24 hours.  Take MiraLAX daily for the next 2 weeks.  Follow-up your doctor if not improved next week or return if worse